# Patient Record
Sex: FEMALE | Race: BLACK OR AFRICAN AMERICAN | NOT HISPANIC OR LATINO | Employment: OTHER | URBAN - METROPOLITAN AREA
[De-identification: names, ages, dates, MRNs, and addresses within clinical notes are randomized per-mention and may not be internally consistent; named-entity substitution may affect disease eponyms.]

---

## 2018-10-30 ENCOUNTER — OFFICE VISIT (OUTPATIENT)
Dept: OBSTETRICS AND GYNECOLOGY | Facility: CLINIC | Age: 38
End: 2018-10-30

## 2018-10-30 VITALS
SYSTOLIC BLOOD PRESSURE: 105 MMHG | WEIGHT: 145 LBS | HEIGHT: 62 IN | DIASTOLIC BLOOD PRESSURE: 63 MMHG | BODY MASS INDEX: 26.68 KG/M2 | HEART RATE: 82 BPM

## 2018-10-30 DIAGNOSIS — Z31.69 ENCOUNTER FOR PRECONCEPTION CONSULTATION: ICD-10-CM

## 2018-10-30 DIAGNOSIS — Z01.419 VISIT FOR GYNECOLOGIC EXAMINATION: Primary | ICD-10-CM

## 2018-10-30 PROCEDURE — 99385 PREV VISIT NEW AGE 18-39: CPT | Performed by: OBSTETRICS & GYNECOLOGY

## 2018-10-30 RX ORDER — LEVOTHYROXINE SODIUM 0.05 MG/1
TABLET ORAL
COMMUNITY
Start: 2016-10-06

## 2018-10-30 NOTE — PROGRESS NOTES
[facility]  [address], [address]  [place], [place] [address]  Phone: [phone] / Fax:  [phone]      10/30/2018    [place] [address]    Abiel Ryan MD    Chief Complaint   Patient presents with   • Gynecologic Exam     Np Annual Exam, pt would like to discuss conception.       Ashtyn Mobley is here for annual gynecologic exam.  HPI - 38 year old patient for gynecologic exam as well as to discuss conception.  Patient is not on contraception and has regular cycles.  However, she has a poor obstetrical history.  Her first pregnancy ended in delivery at 22 weeks; patient states that there was no attempt to save the pregnancy because of viability issues.  Subsequently, she had multiple first trimester losses, include one set of twins.  Most losses occurred before cardiac activity.  She has not been able to conceive in 2 years.  She had a work up with Dr Hunt for infertility that included blood work, ultrasound and laparoscopy -- per the patient, the only findings were hypothyroidism and possible insulin resistance.      Past Medical History:   Diagnosis Date   • Disease of thyroid gland        Past Surgical History:   Procedure Laterality Date   • BACK SURGERY  10/24/2017    lower lumbar       No Known Allergies    Social History     Social History   • Marital status:      Spouse name: N/A   • Number of children: N/A   • Years of education: N/A     Occupational History   • Not on file.     Social History Main Topics   • Smoking status: Never Smoker   • Smokeless tobacco: Not on file   • Alcohol use No   • Drug use: No   • Sexual activity: Yes     Birth control/ protection: None     Other Topics Concern   • Not on file     Social History Narrative   • No narrative on file       History reviewed. No pertinent family history.    Patient's last menstrual period was 10/20/2018 (exact date).    OB History      Para Term  AB Living    7 1   1 6      SAB TAB  "Ectopic Molar Multiple Live Births    6       1            Vitals:    10/30/18 1049   BP: 105/63   Pulse: 82   Weight: 65.8 kg (145 lb)   Height: 157.5 cm (62\")       Physical Exam   Constitutional: She appears well-developed and well-nourished.   Genitourinary: Vagina normal and uterus normal. Pelvic exam was performed with patient supine. There is no tenderness or lesion on the right labia. There is no tenderness or lesion on the left labia. No foreign body in the vagina. No vaginal discharge found. Right adnexum does not display tenderness and does not display fullness. Left adnexum does not display tenderness and does not display fullness. Cervix does not exhibit motion tenderness or lesion. Uterus is not enlarged.   HENT:   Right Ear: External ear normal.   Left Ear: External ear normal.   Nose: Nose normal.   Eyes: Conjunctivae are normal.   Neck: Normal range of motion. Neck supple. No thyromegaly present.   Cardiovascular: Normal rate, regular rhythm and normal heart sounds.    Pulmonary/Chest: Effort normal. She has no wheezes. She has no rales. Right breast exhibits no mass and no nipple discharge. Left breast exhibits no mass and no nipple discharge.   Abdominal: Soft. There is no tenderness. There is no guarding.   Musculoskeletal: Normal range of motion. She exhibits no edema.   Neurological: She is alert.   Skin: Skin is warm and dry. No erythema.   Psychiatric: She has a normal mood and affect. Her behavior is normal. Judgment and thought content normal.   Vitals reviewed.      Ashtyn was seen today for gynecologic exam.    Diagnoses and all orders for this visit:    Visit for gynecologic examination  -  Discussed importance of regular screening and breast awareness  Encounter for preconception consultation  -  Obtain records from Dr Hunt and from [facility] regarding delivery.  Patient plan to contact Dr Hunt about returning for fertility evaluation.      Pérez Mcnair MD      "

## 2018-10-31 ENCOUNTER — TELEPHONE (OUTPATIENT)
Dept: OBSTETRICS AND GYNECOLOGY | Facility: CLINIC | Age: 38
End: 2018-10-31

## 2018-10-31 NOTE — TELEPHONE ENCOUNTER
Per [facility] CHAUHAN, no delivery records for this pt.  Pt confirmed, she delivered at [facility] in 2010.  BRIAN faxed to Dr. Hunt's office for records.